# Patient Record
Sex: FEMALE | Race: WHITE | NOT HISPANIC OR LATINO | Employment: UNEMPLOYED | URBAN - METROPOLITAN AREA
[De-identification: names, ages, dates, MRNs, and addresses within clinical notes are randomized per-mention and may not be internally consistent; named-entity substitution may affect disease eponyms.]

---

## 2017-11-09 ENCOUNTER — HOSPITAL ENCOUNTER (EMERGENCY)
Facility: HOSPITAL | Age: 47
End: 2017-11-09
Attending: EMERGENCY MEDICINE
Payer: MEDICAID

## 2017-11-09 VITALS
WEIGHT: 127 LBS | OXYGEN SATURATION: 100 % | BODY MASS INDEX: 20.5 KG/M2 | DIASTOLIC BLOOD PRESSURE: 70 MMHG | SYSTOLIC BLOOD PRESSURE: 119 MMHG | TEMPERATURE: 98 F | HEART RATE: 61 BPM | RESPIRATION RATE: 18 BRPM

## 2017-11-09 DIAGNOSIS — F29 PSYCHOSIS, UNSPECIFIED PSYCHOSIS TYPE: Primary | ICD-10-CM

## 2017-11-09 LAB
ALBUMIN SERPL BCP-MCNC: 4.3 G/DL
ALP SERPL-CCNC: 50 U/L
ALT SERPL W/O P-5'-P-CCNC: 17 U/L
AMPHET+METHAMPHET UR QL: NEGATIVE
ANION GAP SERPL CALC-SCNC: 8 MMOL/L
APAP SERPL-MCNC: 12 UG/ML
AST SERPL-CCNC: 20 U/L
B-HCG UR QL: NEGATIVE
BARBITURATES UR QL SCN>200 NG/ML: NEGATIVE
BASOPHILS # BLD AUTO: 0.03 K/UL
BASOPHILS NFR BLD: 0.2 %
BENZODIAZ UR QL SCN>200 NG/ML: NORMAL
BILIRUB SERPL-MCNC: 0.3 MG/DL
BILIRUB UR QL STRIP: NEGATIVE
BUN SERPL-MCNC: 12 MG/DL
BZE UR QL SCN: NEGATIVE
CALCIUM SERPL-MCNC: 9.5 MG/DL
CANNABINOIDS UR QL SCN: NORMAL
CHLORIDE SERPL-SCNC: 104 MMOL/L
CLARITY UR: CLEAR
CO2 SERPL-SCNC: 26 MMOL/L
COLOR UR: YELLOW
CREAT SERPL-MCNC: 0.8 MG/DL
CREAT UR-MCNC: 104 MG/DL
DIFFERENTIAL METHOD: ABNORMAL
EOSINOPHIL # BLD AUTO: 0.1 K/UL
EOSINOPHIL NFR BLD: 0.7 %
ERYTHROCYTE [DISTWIDTH] IN BLOOD BY AUTOMATED COUNT: 12.7 %
EST. GFR  (AFRICAN AMERICAN): >60 ML/MIN/1.73 M^2
EST. GFR  (NON AFRICAN AMERICAN): >60 ML/MIN/1.73 M^2
ETHANOL SERPL-MCNC: <10 MG/DL
GLUCOSE SERPL-MCNC: 106 MG/DL
GLUCOSE UR QL STRIP: NEGATIVE
HCT VFR BLD AUTO: 39 %
HGB BLD-MCNC: 13.2 G/DL
HGB UR QL STRIP: ABNORMAL
HYALINE CASTS #/AREA URNS LPF: 35 /LPF
KETONES UR QL STRIP: NEGATIVE
LEUKOCYTE ESTERASE UR QL STRIP: NEGATIVE
LYMPHOCYTES # BLD AUTO: 2.4 K/UL
LYMPHOCYTES NFR BLD: 16.4 %
MCH RBC QN AUTO: 33.2 PG
MCHC RBC AUTO-ENTMCNC: 33.8 G/DL
MCV RBC AUTO: 98 FL
METHADONE UR QL SCN>300 NG/ML: NEGATIVE
MICROSCOPIC COMMENT: ABNORMAL
MONOCYTES # BLD AUTO: 0.7 K/UL
MONOCYTES NFR BLD: 5 %
NEUTROPHILS # BLD AUTO: 11.3 K/UL
NEUTROPHILS NFR BLD: 77.7 %
NITRITE UR QL STRIP: NEGATIVE
OPIATES UR QL SCN: NORMAL
PCP UR QL SCN>25 NG/ML: NEGATIVE
PH UR STRIP: 6 [PH] (ref 5–8)
PLATELET # BLD AUTO: 332 K/UL
PMV BLD AUTO: 9.4 FL
POTASSIUM SERPL-SCNC: 3.6 MMOL/L
PROT SERPL-MCNC: 7.4 G/DL
PROT UR QL STRIP: ABNORMAL
RBC # BLD AUTO: 3.97 M/UL
RBC #/AREA URNS HPF: 2 /HPF (ref 0–4)
SALICYLATES SERPL-MCNC: <5 MG/DL
SODIUM SERPL-SCNC: 138 MMOL/L
SP GR UR STRIP: 1.02 (ref 1–1.03)
TOXICOLOGY INFORMATION: NORMAL
TSH SERPL DL<=0.005 MIU/L-ACNC: 3.43 UIU/ML
URN SPEC COLLECT METH UR: ABNORMAL
UROBILINOGEN UR STRIP-ACNC: NEGATIVE EU/DL
WBC # BLD AUTO: 14.53 K/UL
WBC #/AREA URNS HPF: 1 /HPF (ref 0–5)

## 2017-11-09 PROCEDURE — 85025 COMPLETE CBC W/AUTO DIFF WBC: CPT

## 2017-11-09 PROCEDURE — 81000 URINALYSIS NONAUTO W/SCOPE: CPT

## 2017-11-09 PROCEDURE — 80320 DRUG SCREEN QUANTALCOHOLS: CPT

## 2017-11-09 PROCEDURE — 80307 DRUG TEST PRSMV CHEM ANLYZR: CPT

## 2017-11-09 PROCEDURE — 81025 URINE PREGNANCY TEST: CPT

## 2017-11-09 PROCEDURE — 80329 ANALGESICS NON-OPIOID 1 OR 2: CPT

## 2017-11-09 PROCEDURE — 99284 EMERGENCY DEPT VISIT MOD MDM: CPT | Mod: GT,SA,HB,

## 2017-11-09 PROCEDURE — 99285 EMERGENCY DEPT VISIT HI MDM: CPT

## 2017-11-09 PROCEDURE — 80053 COMPREHEN METABOLIC PANEL: CPT

## 2017-11-09 PROCEDURE — 84443 ASSAY THYROID STIM HORMONE: CPT

## 2017-11-09 NOTE — ED PROVIDER NOTES
"SCRIBE #1 NOTE: I, Alfa Burns Jo, am scribing for, and in the presence of, Blair Palomo Do, MD. I have scribed the entire note.      History      Chief Complaint   Patient presents with    Psychiatric Evaluation     Pt brought to ED for auditory hallucinations tellling her to get naked roll around on floor.         Review of patient's allergies indicates:   Allergen Reactions    Pcn [penicillins] Nausea And Vomiting        HPI   HPI    11/9/2017, 1:28 PM   History obtained from the patient and AASI      History of Present Illness limited due to pt AMS: Kacy Barragan is a 47 y.o. female patient who presents to the Emergency Department for psychiatric evaluation. AASI reports sister called 911 after finding pt rolling around on the floor and not acting right. AASI reports pt was masturbating with a swiffer and was trying to grope EMT workers en route. Pt states that she has been hearing voices for "a long time". Sxs are constant and moderate in severity. Pt reports banging her head against the floor. Per AASI, sister has become increasingly concerned about pt's behavior and believes pt may be using drugs. Pt reports taking suboxone and "half of a pain pill" today. There are no mitigating or exacerbating factors noted. AASI denies any fever, N/V/D, seizure, fall, choking, HI, suicide attempt, and all other sxs at this time. Pt restrained en route. No further complaints or concerns at this time.       Arrival mode: AAS    PCP: Primary Doctor No       Past Medical History:  History reviewed. No pertinent past medical history.    Past Surgical History:  Past Surgical History:   Procedure Laterality Date    BREAST SURGERY           Family History:  History reviewed. No pertinent family history.    Social History:  Social History     Social History Main Topics    Smoking status: Current Every Day Smoker     Packs/day: 0.50    Smokeless tobacco: unknown    Alcohol use No    Drug use:       Comment: suboxone, " pain pills    Sexual activity: unknown       ROS   Review of Systems   Unable to perform ROS: Mental status change       Physical Exam      Initial Vitals [11/09/17 1330]   BP Pulse Resp Temp SpO2   124/77 65 18 98 °F (36.7 °C) 100 %      MAP       92.67            Physical Exam  Nursing Notes and Vital Signs Reviewed.  Constitutional: Patient is in no acute distress.  Head: Atraumatic. Normocephalic.  Eyes: PERRL. EOM intact. Conjunctivae are not pale. No scleral icterus.  ENT: Mucous membranes are moist. Oropharynx is clear and symmetric.    Neck: Supple. Full ROM. No lymphadenopathy.  Cardiovascular: Regular rate. Regular rhythm. No murmurs, rubs, or gallops. Distal pulses are 2+ and symmetric.  Pulmonary/Chest: No respiratory distress. Clear to auscultation bilaterally. No wheezing, rales, or rhonchi.  Abdominal: Soft and non-distended.  There is no tenderness.  No rebound, guarding, or rigidity. Good bowel sounds.  Genitourinary: No CVA tenderness  Musculoskeletal: Moves all extremities. No obvious deformities. No edema. No calf tenderness.  Skin: Warm and dry.  Neurological: Awake. No acute focal neurological deficits are appreciated.  Psychiatric:               Behavior: tearful, eye contact minimal               Thought Process: poor insight, poor judgement              Suicidal Ideations: yes              Suicidal Plan: No specific plan to harm self              Homicidal Ideations: No              Hallucinations: auditory      ED Course    Procedures  ED Vital Signs:  Vitals:    11/09/17 1330   BP: 124/77   Pulse: 65   Resp: 18   Temp: 98 °F (36.7 °C)   TempSrc: Oral   SpO2: 100%       Abnormal Lab Results:  Labs Reviewed   CBC W/ AUTO DIFFERENTIAL - Abnormal; Notable for the following:        Result Value    WBC 14.53 (*)     RBC 3.97 (*)     MCH 33.2 (*)     Gran # 11.3 (*)     Gran% 77.7 (*)     Lymph% 16.4 (*)     All other components within normal limits   COMPREHENSIVE METABOLIC PANEL - Abnormal;  Notable for the following:     Alkaline Phosphatase 50 (*)     All other components within normal limits   URINALYSIS - Abnormal; Notable for the following:     Protein, UA Trace (*)     Occult Blood UA 1+ (*)     All other components within normal limits   SALICYLATE LEVEL - Abnormal; Notable for the following:     Salicylate Lvl <5.0 (*)     All other components within normal limits   URINALYSIS MICROSCOPIC - Abnormal; Notable for the following:     Hyaline Casts, UA 35 (*)     All other components within normal limits   TSH   DRUG SCREEN PANEL, URINE EMERGENCY   ALCOHOL,MEDICAL (ETHANOL)   ACETAMINOPHEN LEVEL   PREGNANCY TEST, URINE RAPID        All Lab Results:  Results for orders placed or performed during the hospital encounter of 11/09/17   CBC auto differential   Result Value Ref Range    WBC 14.53 (H) 3.90 - 12.70 K/uL    RBC 3.97 (L) 4.00 - 5.40 M/uL    Hemoglobin 13.2 12.0 - 16.0 g/dL    Hematocrit 39.0 37.0 - 48.5 %    MCV 98 82 - 98 fL    MCH 33.2 (H) 27.0 - 31.0 pg    MCHC 33.8 32.0 - 36.0 g/dL    RDW 12.7 11.5 - 14.5 %    Platelets 332 150 - 350 K/uL    MPV 9.4 9.2 - 12.9 fL    Gran # 11.3 (H) 1.8 - 7.7 K/uL    Lymph # 2.4 1.0 - 4.8 K/uL    Mono # 0.7 0.3 - 1.0 K/uL    Eos # 0.1 0.0 - 0.5 K/uL    Baso # 0.03 0.00 - 0.20 K/uL    Gran% 77.7 (H) 38.0 - 73.0 %    Lymph% 16.4 (L) 18.0 - 48.0 %    Mono% 5.0 4.0 - 15.0 %    Eosinophil% 0.7 0.0 - 8.0 %    Basophil% 0.2 0.0 - 1.9 %    Differential Method Automated    Comprehensive metabolic panel   Result Value Ref Range    Sodium 138 136 - 145 mmol/L    Potassium 3.6 3.5 - 5.1 mmol/L    Chloride 104 95 - 110 mmol/L    CO2 26 23 - 29 mmol/L    Glucose 106 70 - 110 mg/dL    BUN, Bld 12 6 - 20 mg/dL    Creatinine 0.8 0.5 - 1.4 mg/dL    Calcium 9.5 8.7 - 10.5 mg/dL    Total Protein 7.4 6.0 - 8.4 g/dL    Albumin 4.3 3.5 - 5.2 g/dL    Total Bilirubin 0.3 0.1 - 1.0 mg/dL    Alkaline Phosphatase 50 (L) 55 - 135 U/L    AST 20 10 - 40 U/L    ALT 17 10 - 44 U/L    Anion  Gap 8 8 - 16 mmol/L    eGFR if African American >60 >60 mL/min/1.73 m^2    eGFR if non African American >60 >60 mL/min/1.73 m^2   TSH   Result Value Ref Range    TSH 3.433 0.400 - 4.000 uIU/mL   Urinalysis - clean catch   Result Value Ref Range    Specimen UA Urine, Clean Catch     Color, UA Yellow Yellow, Straw, Jasmyne    Appearance, UA Clear Clear    pH, UA 6.0 5.0 - 8.0    Specific Gravity, UA 1.020 1.005 - 1.030    Protein, UA Trace (A) Negative    Glucose, UA Negative Negative    Ketones, UA Negative Negative    Bilirubin (UA) Negative Negative    Occult Blood UA 1+ (A) Negative    Nitrite, UA Negative Negative    Urobilinogen, UA Negative <2.0 EU/dL    Leukocytes, UA Negative Negative   Drug screen panel, emergency   Result Value Ref Range    Benzodiazepines Presumptive Positive     Methadone metabolites Negative     Cocaine (Metab.) Negative     Opiate Scrn, Ur Presumptive Positive     Barbiturate Screen, Ur Negative     Amphetamine Screen, Ur Negative     THC Presumptive Positive     Phencyclidine Negative     Creatinine, Random Ur 104.0 15.0 - 325.0 mg/dL    Toxicology Information SEE COMMENT    Ethanol   Result Value Ref Range    Alcohol, Medical, Serum <10 <10 mg/dL   Acetaminophen level   Result Value Ref Range    Acetaminophen (Tylenol), Serum 12.0 10.0 - 20.0 ug/mL   Salicylate level   Result Value Ref Range    Salicylate Lvl <5.0 (L) 15.0 - 30.0 mg/dL   Rapid Pregnancy, Urine   Result Value Ref Range    Preg Test, Ur Negative    Urinalysis Microscopic   Result Value Ref Range    RBC, UA 2 0 - 4 /hpf    WBC, UA 1 0 - 5 /hpf    Hyaline Casts, UA 35 (A) 0-1/lpf /lpf    Microscopic Comment SEE COMMENT             The Emergency Provider reviewed the vital signs and test results, which are outlined above.    ED Discussion     1:28 PM: The PEC hold has been issued by Dr. Sanchez at this time for auditory hallucinations.    3:50 PM: Dr. Sanchez discussed the pt's case with Ninfa Gan NP (Tele-psych) who  recommends keeping pt PEC'd at this time.    4:08 PM: Pt has been medically cleared by Dr. Sanchez at this time. Reassessed pt at this time. Pt is resting comfortably and appears in no acute distress. There are no psychiatric services offered at this facility. D/w pt all pertinent ED information and plan to transfer to psychiatric facility for psychiatric treatment. Pt verbalizes understanding. Patient being transferred by [facility] for ongoing personal protection en route. Pt will be transported by personnel trained in CPR and CPI. All questions and complaints have been addressed at this time. Pt condition is stable at this time and is clear to transfer to psychiatric facility at this time.     ED Medication(s):  Medications - No data to display    New Prescriptions    No medications on file             Medical Decision Making    Medical Decision Making:   Clinical Tests:   Lab Tests: Reviewed and Ordered           Scribe Attestation:   Scribe #1: I performed the above scribed service and the documentation accurately describes the services I performed. I attest to the accuracy of the note.    Attending:   Physician Attestation Statement for Scribe #1: I, Blair Palomo Do, MD, personally performed the services described in this documentation, as scribed by Alfa Osorio, in my presence, and it is both accurate and complete.          Clinical Impression       ICD-10-CM ICD-9-CM   1. Psychosis, unspecified psychosis type F29 298.9       Disposition:   Disposition: Transferred  Condition: Stable         Blair Palomo Do, MD  11/09/17 9905

## 2017-11-09 NOTE — ED NOTES
Received call from ZULEMA Olson intake; pt accepted to [facility].  Dr. CHANDNI Bryan accepting.    [address] [facility], [place], [place].  Call report to: [phone]

## 2017-11-09 NOTE — ED NOTES
Pt awake, alert, and oriented to time, place, and situation.  Pt denies auditory hallucinations at this time.  Pt is states she is unable to recall the behaviors documented in the HPI.  Pt is calm and cooperative.  Dr. Sanchez updated, tele-psyc to evaluate pt.

## 2017-11-09 NOTE — PROVIDER PROGRESS NOTES - EMERGENCY DEPT.
Encounter Date: 11/9/2017    ED Physician Progress Notes        Physician Note:   {[facility] ED PROG1 TEXT}

## 2017-11-09 NOTE — CONSULTS
"Tele-Consultation to Emergency Department from Psychiatry    Please see previous notes: per chart    Patient agreeable to consultation via telepsychiatry.    Consultation started: 11/9/2017 at 3:44 PM  The chief complaint leading to psychiatric consultation is: auditory hallucinations  This consultation was requested by Dr. Sanchez, the Emergency Department attending physician.  The location of the consulting psychiatrist is [address] [place] [address].  The patient location is [facility].  The patient arrived at the ED at: 12:59 on 11/9/17      Patient Identification:  Kacy Barragan is a 47 y.o. female.    Patient information was obtained from patient and Dr. Sanchez..  Patient presented involuntarily to the Emergency Department by ambulance.    History of Present Illness:  Spoke via telepsychiatry monitor with Dr. Sanchez prior to evaluating the patient. She stated she had spoke with patient's siblings, who stated patient has been having auditory and visual hallucinations for months. Earlier today, the patient used marijuana, hydrocodone, and suboxone and was found by her sister lying on the floor of the kitchen masturbating with a swiffer. She stated that she was hearing voices telling her to kill herself. She had to be restrained in the ambulance.     The patient was coherent and cooperative at the time I examined her; she denied any recollection of the events of earlier today, states that she "blacked out" and was "in and out" for several hours. She denies current suicidal ideation or intent to harm herself or others. She made it very clear that she did not want to be hospitalized, and I believe it is likely that she was minimizing her psychiatric symptoms in an effort to avoid hospitalization.     Psychiatric History:   Hospitalization: unknown  Medication Trials: unknown  Suicide Attempts: denies  Violence: denies  Depression: unknown  Carissa: unknown  AH's: see  HPI  Delusions: unknown    Review of Systems " "  Unable to perform ROS: Psychiatric disorder     Past Medical History: History reviewed. No pertinent past medical history.     Seizures: had a seizure during her pregnancy 29 years ago  Head trauma/l.o.c.: unknown    Review of patient's allergies indicates:   Allergen Reactions    Pcn [penicillins] Nausea And Vomiting       Medications in ER: Medications - No data to display    Home Meds: unknown    Substance Abuse History:   Alchohol: "hardly ever"  Drug: smoked marijuana earlier today, uses marijuana 3-5x weekly; denies use of synthetic marijuana or "mojo." She took hydrocodone 7.5mg earlier today (obtained from brother), states she takes opioid painkillers prescribed to others daily; also used suboxone earlier today, which she obtained from her sister. History of occasional adderall or vyvanse use (also not prescribed for her) but stopped recently because it increased anxiety.    Legal History:   Past charges/incarcerations: hx of arrest for unpaid tickets  Pending charges: denies    Family Psychiatric History: "not that I know of" several aunts and uncles with etoh and substance use disorder    Social History:   History of Physical/Sexual Abuse: denies  Education: graduated   Employment/Disability: works with , who remodels houses  Financial: employed  Relationship Status/Sexual Orientation:   Children: one daughter age 29  Housing Status: lives with ex-   [employer]ar[employer]: Shinto   History: denies  Access to Gun: denies    Current Evaluation:     Constitutional  Vitals:  Vitals:    11/09/17 1330   BP: 124/77   Pulse: 65   Resp: 18   Temp: 98 °F (36.7 °C)   TempSrc: Oral   SpO2: 100%      General:  unremarkable, age appropriate     Musculoskeletal  Muscle Strength/Tone:   moving arms normally   Gait & Station:   sitting on stretcher     Psychiatric  Level of Consciousness: alert  Orientation: oriented to person, place and time  Grooming: in hospital gown  Psychomotor Behavior: no " agitation  Speech: normal in rate, rhythm and volume  Language: uses words appropriately  Mood: anxious  Affect: mood-congruent  Thought Process: linear and goal-directed  Associations: intact  Thought Content: command auditory hallucinations  Memory: impaired in recent  Attention: intact to interview  Fund of Knowledge: not tested  Insight: poor  Judgement: poor    Relevant Elements of Neurological Exam: no abnormality of posture noted    Assessment - Diagnosis - Goals:     Diagnosis/Impression: drug-induced psychosis    Rec: PEC, medical clearance and psychiatric hospitalization; although this patient's behavior is organized at the time of the telepsychiatry evaluation, her behavior earlier today put herself and others at risk and her siblings report that she has been experiencing auditory hallucinations for several months. It is likely that if she continues to use substances she will have another psychotic episode, which could put her or others at risk given that she heard command auditory hallucinations telling her to kill herself today. I spoke with Dr. Sanchez about this and we were in agreement.    Laboratory Data:   Labs Reviewed   CBC W/ AUTO DIFFERENTIAL - Abnormal; Notable for the following:        Result Value    WBC 14.53 (*)     RBC 3.97 (*)     MCH 33.2 (*)     Gran # 11.3 (*)     Gran% 77.7 (*)     Lymph% 16.4 (*)     All other components within normal limits   COMPREHENSIVE METABOLIC PANEL - Abnormal; Notable for the following:     Alkaline Phosphatase 50 (*)     All other components within normal limits   URINALYSIS - Abnormal; Notable for the following:     Protein, UA Trace (*)     Occult Blood UA 1+ (*)     All other components within normal limits   SALICYLATE LEVEL - Abnormal; Notable for the following:     Salicylate Lvl <5.0 (*)     All other components within normal limits   URINALYSIS MICROSCOPIC - Abnormal; Notable for the following:     Hyaline Casts, UA 35 (*)     All other components within  normal limits   TSH   DRUG SCREEN PANEL, URINE EMERGENCY   ALCOHOL,MEDICAL (ETHANOL)   ACETAMINOPHEN LEVEL   PREGNANCY TEST, URINE RAPID         Consulting clinician was informed of the encounter and consult note.    Consultation ended: 11/9/2017 at 16:27.

## 2017-11-09 NOTE — PROVIDER PROGRESS NOTES - EMERGENCY DEPT.
4:55 PM:   Accepting Facility: [facility]  Accepting Physician: Dr. DEEPA Bryan

## 2017-11-10 NOTE — ED NOTES
Pt sitting upright in bed, aaox4, in no acute distress with no complaint. Pt aware she is under PEC and awaiting transport to [facility]. Pt remains in grey gown and yellow socks with Xochitl chacon, at bedside performing q15min checks. Will continue to monitor.

## 2017-11-10 NOTE — ED NOTES
Pt lying in bed, aaox4, in no acute distress and with no complaint. Pt is aware she is under PEC and is awaiting transport to [facility]. Pt remains in grey gown and yellow socks with Akin chacon at bedside performing q15min checks. Will continue to monitor.

## 2017-11-10 NOTE — ED NOTES
"Pt presents to the ED experiencing auditory hallucinations.  Pt repeatedly states, "just put me to sleep, make the voices stop."  Pt reports auditory hallucinations that are telling her to hurt herself.    "

## 2017-11-10 NOTE — ED NOTES
[facility] here for transport. Pt changed into paper scrubs. Pt belongings and PEC packet given to [facility]. Pt left in care of [facility] employees, escorted by hospital security, at this time.

## 2017-11-10 NOTE — ED NOTES
Belongings: slippers and necklace placed in clear belongings bag with pt label and locked in PEC cabient 28    All other belongings sent home with sister-in-law per previous nurse

## 2017-11-10 NOTE — ED NOTES
Pt lying in bed, aaox4, in no acute distress and with no complaint. Pt aware she is under PEC and that she is awaiting transport to [facility]. Pt remains in grey gown and yellow socks with Azam chacon at bedside performing q15min checks. Will continue to monitor.

## 2017-11-12 PROBLEM — H91.90 DECREASED HEARING: Status: ACTIVE | Noted: 2017-11-12

## 2017-11-15 PROBLEM — Z72.0 NICOTINE ABUSE: Status: ACTIVE | Noted: 2017-11-15
